# Patient Record
Sex: FEMALE | URBAN - METROPOLITAN AREA
[De-identification: names, ages, dates, MRNs, and addresses within clinical notes are randomized per-mention and may not be internally consistent; named-entity substitution may affect disease eponyms.]

---

## 2021-10-07 ENCOUNTER — APPOINTMENT (RX ONLY)
Dept: URBAN - METROPOLITAN AREA CLINIC 308 | Facility: CLINIC | Age: 26
Setting detail: DERMATOLOGY
End: 2021-10-07

## 2021-10-07 DIAGNOSIS — L42 PITYRIASIS ROSEA: ICD-10-CM

## 2021-10-07 PROCEDURE — ? TREATMENT REGIMEN

## 2021-10-07 PROCEDURE — 99203 OFFICE O/P NEW LOW 30 MIN: CPT

## 2021-10-07 PROCEDURE — ? ADDITIONAL NOTES

## 2021-10-07 PROCEDURE — ? COUNSELING

## 2021-10-07 NOTE — PROCEDURE: TREATMENT REGIMEN
Samples Given: CeraVe itch relief lotion.
Discontinue Regimen: Ciclopirox
Continue Regimen: Can continue TAC BID but advised her this may only be useful for symptom relief. Lesions will resolve on their own.
Detail Level: Zone

## 2021-10-07 NOTE — HPI: RASH
Is This A New Presentation, Or A Follow-Up?: Rash
Additional History: Rash all over body x 10 days. Very itchy. Has been to urgent care twice. Was prescribed Triamcinolone, Hydroxyzine and ciclopirox which does not help at all. She is taking claritin as well. Started on left trunk and then spread to chest, abdomen and back. Patient states she just moved into a new house.